# Patient Record
Sex: MALE | Race: WHITE | NOT HISPANIC OR LATINO | URBAN - METROPOLITAN AREA
[De-identification: names, ages, dates, MRNs, and addresses within clinical notes are randomized per-mention and may not be internally consistent; named-entity substitution may affect disease eponyms.]

---

## 2017-01-25 ENCOUNTER — OFFICE VISIT - HEALTHEAST (OUTPATIENT)
Dept: FAMILY MEDICINE | Facility: CLINIC | Age: 32
End: 2017-01-25

## 2017-01-25 ENCOUNTER — COMMUNICATION - HEALTHEAST (OUTPATIENT)
Dept: FAMILY MEDICINE | Facility: CLINIC | Age: 32
End: 2017-01-25

## 2017-01-25 DIAGNOSIS — Z30.09 VASECTOMY EVALUATION: ICD-10-CM

## 2017-01-25 DIAGNOSIS — Z00.00 PHYSICAL EXAM: ICD-10-CM

## 2017-01-25 LAB
CHOLEST SERPL-MCNC: 222 MG/DL
FASTING STATUS PATIENT QL REPORTED: YES
HDLC SERPL-MCNC: 53 MG/DL
LDLC SERPL CALC-MCNC: 147 MG/DL
TRIGL SERPL-MCNC: 111 MG/DL

## 2017-01-25 ASSESSMENT — MIFFLIN-ST. JEOR: SCORE: 1533.2

## 2017-02-01 ENCOUNTER — RECORDS - HEALTHEAST (OUTPATIENT)
Dept: ADMINISTRATIVE | Facility: OTHER | Age: 32
End: 2017-02-01

## 2017-02-03 ENCOUNTER — COMMUNICATION - HEALTHEAST (OUTPATIENT)
Dept: FAMILY MEDICINE | Facility: CLINIC | Age: 32
End: 2017-02-03

## 2017-02-10 ENCOUNTER — OFFICE VISIT - HEALTHEAST (OUTPATIENT)
Dept: FAMILY MEDICINE | Facility: CLINIC | Age: 32
End: 2017-02-10

## 2017-02-10 DIAGNOSIS — R07.89 CHEST DISCOMFORT: ICD-10-CM

## 2017-02-10 DIAGNOSIS — F41.1 ANXIETY STATE: ICD-10-CM

## 2017-02-13 ENCOUNTER — COMMUNICATION - HEALTHEAST (OUTPATIENT)
Dept: FAMILY MEDICINE | Facility: CLINIC | Age: 32
End: 2017-02-13

## 2017-02-13 LAB
ATRIAL RATE - MUSE: 78 BPM
DIASTOLIC BLOOD PRESSURE - MUSE: NORMAL MMHG
INTERPRETATION ECG - MUSE: NORMAL
P AXIS - MUSE: 50 DEGREES
PR INTERVAL - MUSE: 122 MS
QRS DURATION - MUSE: 92 MS
QT - MUSE: 364 MS
QTC - MUSE: 414 MS
R AXIS - MUSE: 82 DEGREES
SYSTOLIC BLOOD PRESSURE - MUSE: NORMAL MMHG
T AXIS - MUSE: 78 DEGREES
VENTRICULAR RATE- MUSE: 78 BPM

## 2017-04-28 ENCOUNTER — RECORDS - HEALTHEAST (OUTPATIENT)
Dept: ADMINISTRATIVE | Facility: OTHER | Age: 32
End: 2017-04-28

## 2017-12-22 ENCOUNTER — OFFICE VISIT - HEALTHEAST (OUTPATIENT)
Dept: FAMILY MEDICINE | Facility: CLINIC | Age: 32
End: 2017-12-22

## 2017-12-22 ENCOUNTER — COMMUNICATION - HEALTHEAST (OUTPATIENT)
Dept: FAMILY MEDICINE | Facility: CLINIC | Age: 32
End: 2017-12-22

## 2017-12-22 DIAGNOSIS — K92.1 HEMATOCHEZIA: ICD-10-CM

## 2017-12-29 ENCOUNTER — COMMUNICATION - HEALTHEAST (OUTPATIENT)
Dept: FAMILY MEDICINE | Facility: CLINIC | Age: 32
End: 2017-12-29

## 2018-12-07 ENCOUNTER — AMBULATORY - HEALTHEAST (OUTPATIENT)
Dept: FAMILY MEDICINE | Facility: CLINIC | Age: 33
End: 2018-12-07

## 2018-12-07 DIAGNOSIS — Z91.018 FOOD ALLERGY: ICD-10-CM

## 2018-12-12 ENCOUNTER — COMMUNICATION - HEALTHEAST (OUTPATIENT)
Dept: FAMILY MEDICINE | Facility: CLINIC | Age: 33
End: 2018-12-12

## 2020-10-01 ENCOUNTER — COMMUNICATION - HEALTHEAST (OUTPATIENT)
Dept: FAMILY MEDICINE | Facility: CLINIC | Age: 35
End: 2020-10-01

## 2020-10-02 ENCOUNTER — OFFICE VISIT - HEALTHEAST (OUTPATIENT)
Dept: FAMILY MEDICINE | Facility: CLINIC | Age: 35
End: 2020-10-02

## 2020-10-02 DIAGNOSIS — Z00.00 HEALTHY ADULT ON ROUTINE PHYSICAL EXAMINATION: ICD-10-CM

## 2020-10-02 DIAGNOSIS — Z11.4 SCREENING FOR HIV (HUMAN IMMUNODEFICIENCY VIRUS): ICD-10-CM

## 2020-10-02 DIAGNOSIS — Z13.220 SCREENING FOR CHOLESTEROL LEVEL: ICD-10-CM

## 2020-10-02 DIAGNOSIS — Z23 NEED FOR IMMUNIZATION AGAINST INFLUENZA: ICD-10-CM

## 2020-10-02 LAB
CHOLEST SERPL-MCNC: 230 MG/DL
FASTING STATUS PATIENT QL REPORTED: YES
HDLC SERPL-MCNC: 57 MG/DL
HIV 1+2 AB+HIV1 P24 AG SERPL QL IA: NEGATIVE
LDLC SERPL CALC-MCNC: 152 MG/DL
TRIGL SERPL-MCNC: 103 MG/DL

## 2020-10-02 ASSESSMENT — MIFFLIN-ST. JEOR: SCORE: 1516.19

## 2020-10-06 ENCOUNTER — AMBULATORY - HEALTHEAST (OUTPATIENT)
Dept: FAMILY MEDICINE | Facility: CLINIC | Age: 35
End: 2020-10-06

## 2020-10-06 DIAGNOSIS — E78.00 HYPERCHOLESTEREMIA: ICD-10-CM

## 2021-03-08 ENCOUNTER — AMBULATORY - HEALTHEAST (OUTPATIENT)
Dept: LAB | Facility: CLINIC | Age: 36
End: 2021-03-08

## 2021-03-08 DIAGNOSIS — E78.00 HYPERCHOLESTEREMIA: ICD-10-CM

## 2021-03-08 LAB
CHOLEST SERPL-MCNC: 197 MG/DL
FASTING STATUS PATIENT QL REPORTED: YES
HDLC SERPL-MCNC: 59 MG/DL
LDLC SERPL CALC-MCNC: 123 MG/DL
TRIGL SERPL-MCNC: 76 MG/DL

## 2021-05-30 VITALS — WEIGHT: 143.38 LBS | BODY MASS INDEX: 22.46 KG/M2

## 2021-05-30 VITALS — HEIGHT: 67 IN | BODY MASS INDEX: 22.13 KG/M2 | WEIGHT: 141 LBS

## 2021-05-31 VITALS — BODY MASS INDEX: 21.77 KG/M2 | WEIGHT: 139 LBS

## 2021-06-04 VITALS
HEART RATE: 88 BPM | HEIGHT: 67 IN | SYSTOLIC BLOOD PRESSURE: 134 MMHG | WEIGHT: 139 LBS | TEMPERATURE: 98.3 F | DIASTOLIC BLOOD PRESSURE: 86 MMHG | BODY MASS INDEX: 21.82 KG/M2 | OXYGEN SATURATION: 100 %

## 2021-06-05 ENCOUNTER — HEALTH MAINTENANCE LETTER (OUTPATIENT)
Age: 36
End: 2021-06-05

## 2021-06-08 NOTE — PROGRESS NOTES
Assessment:      Healthy male exam.      Plan:       Routine lab work will be done today.  The patient will continue a good well-balanced diet.  He also will start a daily aerobic exercise program.  He will limit salt as his blood pressure was borderline today.  He will try to check his pressures outside the clinic more frequently.  He is up-to-date on all other preventive health services.  He would like to have a vasectomy so he will be referred to urology for that.     Subjective:      Dusty Ferro is a 31 y.o. male who presents for an annual exam. The patient reports that there is not domestic violence in his life.  Overall, he is feeling well.  He has no real health issues or concerns at this point in time.  He would like to have a vasectomy.  He will be referred for that.  He states he exercises 2-3 times weekly.  He stays very active otherwise.  He follows a good well-balanced diet.    Healthy Habits:   Regular Exercise: Yes  Sunscreen Use: Yes  Healthy Diet: Yes  Dental Visits Regularly: Yes  Seat Belt: Yes  Sexually active: Yes  Monthly Self Testicular Exams:  No  Hemoccults: No  Flex Sig: No  Colonoscopy: No  Lipid Profile: Yes  Glucose Screen: Yes      Immunization History   Administered Date(s) Administered     Hep A, historic 10/12/2010, 08/16/2011     Influenza, seasonal,quad inj 6-35 mos 12/18/2012     Tdap 10/12/2010     Immunization status: up to date and documented.    No exam data present     No current outpatient prescriptions on file.     No current facility-administered medications for this visit.      No past medical history on file.  No past surgical history on file.  Penicillins  No family history on file.  Social History     Social History     Marital status: Single     Spouse name: N/A     Number of children: N/A     Years of education: N/A     Occupational History     Not on file.     Social History Main Topics     Smoking status: Never Smoker     Smokeless tobacco: Never Used      "Alcohol use 7.2 oz/week     12 Cans of beer per week     Drug use: No     Sexual activity: Yes     Partners: Female     Other Topics Concern     Not on file     Social History Narrative     No narrative on file       Review of Systems  Review of Systems   Constitutional: Negative.  Negative for fatigue and fever.   HENT: Negative.  Negative for congestion.    Eyes: Negative.    Respiratory: Negative.  Negative for shortness of breath.    Cardiovascular: Negative.  Negative for chest pain.   Gastrointestinal: Negative.    Endocrine: Negative.    Genitourinary: Negative.    Musculoskeletal: Negative.    Skin: Negative.  Negative for rash.   Allergic/Immunologic: Negative.    Neurological: Negative.    Hematological: Negative.              Objective:     Vitals:    01/25/17 0801   BP: 148/90   Pulse: 80   Resp: 16   Temp: 98.2  F (36.8  C)   TempSrc: Oral   Weight: 141 lb (64 kg)   Height: 5' 7\" (1.702 m)     Body mass index is 22.08 kg/(m^2).    Physical  Physical Exam   Constitutional: He is oriented to person, place, and time. He appears well-developed and well-nourished. No distress.   HENT:   Right Ear: External ear normal.   Left Ear: External ear normal.   Mouth/Throat: Oropharynx is clear and moist.   Eyes: Conjunctivae and EOM are normal. Pupils are equal, round, and reactive to light.   Neck: Normal range of motion. Neck supple. No JVD present. No thyromegaly present.   Cardiovascular: Normal rate and regular rhythm.    No murmur heard.  Pulmonary/Chest: Effort normal and breath sounds normal. No respiratory distress.   Abdominal: Soft. Bowel sounds are normal. He exhibits no mass. There is no tenderness.   Genitourinary: Penis normal.   Genitourinary Comments: Testicles palpable low in scrotum.  No hernia noted.   Musculoskeletal: Normal range of motion. He exhibits no edema or tenderness.   Lymphadenopathy:     He has no cervical adenopathy.   Neurological: He is alert and oriented to person, place, and " time. He has normal reflexes.   Skin: Skin is warm and dry. No rash noted.   Psychiatric: He has a normal mood and affect.

## 2021-06-08 NOTE — PROGRESS NOTES
"ASSESSMENT AND PLAN:  1. Chest discomfort     Currently patient does not have chest pain he has been experiencing intermittent bouts of chest discomfort lasting 20 minutes for the last 3 days.  It's not exertional.  His cardiac symptoms unremarkable EKG reviewed by myself today showed no acute ST segment changes.  Chest x-ray showed no abnormalities.  The differential would include coronary artery disease, GERD, musculoskeletal discomfort anxiety or pulmonary disease we discussed this in detail with him today.  He will track his symptoms if they continue to be prevalent I which just in exercise stress test.  Experiences excruciating pain or worsening pain he needs to the ER.   Electrocardiogram Perform and Read    XR Chest PA and Lateral   2. Anxiety state the chest pain started when he had increased anxiety at work since then she experienced the chest pain over the last 3 days he's had a similar bout of chest pain previously been an anxiety issue and that only lasted for several hours at that time.  I did go through a BHARATHI profile with him and suggested a low dose SSRI may be in order he will think about that as he does not want to take medication at this time.  He mentions that he's always been \"\" anxious\"         No orders of the defined types were placed in this encounter.    There are no discontinued medications.    No Follow-up on file.    CHIEF COMPLAINT:  Chief Complaint   Patient presents with     Anxiety     Pt states he has been having some chest pressure, ongoing since Wednesday, intermittent per pt.       HISTORY OF PRESENT ILLNESS:  Dusty is a 31 y.o. male presenting with anxiety. He states that he had a stressful day at work on Wednesday and since then he has been experiencing intermittent chest pressure. The pressure feels like his \"blood pressure is up\".  The episodes of chest pressure will last about 20 minutes. He notes that the symptoms will worsen when he does something stressful. The pressure " will move around his chest but does not move into his abdomen. He notes that he has had this sensation before but only for 20 minutes. The pressure does not cause shortness of breath and does not change with eating foods. The pressure does also does not change with postural changes.     REVIEW OF SYSTEMS:   He will have things running through his mind then he goes to bed at night. He notes that he has always been an anxious person.   Denies vomiting, diarrhea and constipation.   He sleeps about 6 hours.   All other 10 point review of systems are negative.    PFSH:  Father has history of heart attack (age 55), a fib and hyperlipidemia. He works in [email or web address]. Reviewed as below.     TOBACCO USE:  History   Smoking Status     Never Smoker   Smokeless Tobacco     Never Used       VITALS:  Vitals:    02/10/17 1226   BP: 138/88   Patient Site: Right Arm   Patient Position: Sitting   Cuff Size: Adult Regular   Pulse: 88   Resp: 20   Temp: 98  F (36.7  C)   TempSrc: Oral   Weight: 143 lb 6 oz (65 kg)     Wt Readings from Last 3 Encounters:   02/10/17 143 lb 6 oz (65 kg)   01/25/17 141 lb (64 kg)     Body mass index is 22.46 kg/(m^2).      PHYSICAL EXAM:  General: Alert, cooperative, no distress, appears stated age  Head: Normocephalic, without obvious abnormality, atraumatic  Ears: Normal TM's and external ear canals, both ears  Nose: Nares normal, septum midline, mucosa normal, no drainage or sinus tenderness  Throat: Lips, mucosa, and tongue normal; teeth and gums normal  Back: Symmetric, no curvature, ROM normal, no CVA tenderness  Lungs: Clear to auscultation bilaterally, respirations unlabored  Chest wall: No tenderness or deformity  Heart: Regular rate and rhythm, S1 and S2 normal, no murmur, rub, or gallop  Abdomen: Soft, non tender, bowel sounds active all four quadrants, no masses, no organomegaly.  Neurologic:  A & O x 3.  No tremor, no focal findings.        EKG: No acute ST changes.     DATA REVIEWED:  Additional  History from Old Records Summarized (2): Reviewed Dr. Mcallister's note from 1/25/2017 regarding physical.  Decision to Obtain Records (1): None  Radiology Tests Summarized or Ordered (1): Chest x-ray ordered.  No abnormalities noted  Labs Reviewed or Ordered (1): Lipid panel and BMP reviewed from annual physical in January 2017  Medicine Test Summarized or Ordered (1): EKG ordered.   Independent Review of EKG or X-RAY(2 each): EKG personally interpreted.  No acute ST segment changes Chest x-ray personally interpreted.     The visit lasted a total of 40 minutes face to face with the patient. Over 50% of the time was spent counseling and educating the patient about chest pressure .     ICarlie, am scribing for and in the presence of, Dr. Estevez.    I, Dr. Estevez, personally performed the services described in this documentation, as scribed by Carlie Davis in my presence, and it is both accurate and complete.      MEDICATIONS:  No current outpatient prescriptions on file.     No current facility-administered medications for this visit.        Total Data Points: 9

## 2021-06-11 NOTE — TELEPHONE ENCOUNTER
Left message that [facility] ([phone]) is calling to start some charting for 10/2/20 physical appt.

## 2021-06-14 NOTE — PROGRESS NOTES
Subjective:      Patient ID: Dusty Ferro is a 32 y.o. male.    Chief Complaint:    HPI Dusty Ferro is a 32 y.o. male who presents today complaining of diarrhea x 1 week. He also has lower abdominal cramping and has had blood in the stool x 1 days. He denies ever having blood in his stool before. The blood was bright red and mixed into the stool with mucous. He has not had any stool today. He thought he may have had a fever last week, but nothing high or prolonged. He denies any recent travel. Yesterday he had about 3-4 stools and just the last one had blood. He denies any recent abx use. He has not ever taken any medications over the counter for his symptoms.       Family History   Problem Relation Age of Onset     Heart attack Father 55     Hyperlipidemia Father      Atrial fibrillation Father        Social History   Substance Use Topics     Smoking status: Never Smoker     Smokeless tobacco: Never Used     Alcohol use 7.2 oz/week     12 Cans of beer per week       Review of Systems   Constitutional: Positive for fatigue. Negative for appetite change and fever.   Gastrointestinal: Positive for abdominal pain (cramping), blood in stool and diarrhea. Negative for nausea and vomiting.   Neurological: Positive for light-headedness.       Objective:     /70  Pulse 74  Temp 98.1  F (36.7  C) (Oral)   Resp 14  Wt 139 lb (63 kg)  SpO2 98%  BMI 21.77 kg/m2    Physical Exam   Constitutional: He appears well-developed and well-nourished. No distress.   HENT:   Head: Normocephalic and atraumatic.   Right Ear: External ear normal.   Left Ear: External ear normal.   Eyes: Conjunctivae are normal.   Cardiovascular: Normal rate, regular rhythm and normal heart sounds.  Exam reveals no gallop and no friction rub.    No murmur heard.  Pulmonary/Chest: Effort normal and breath sounds normal. No respiratory distress. He has no wheezes. He has no rales.   Abdominal: Soft. Bowel sounds are normal. He exhibits no  distension. There is no tenderness. There is no rebound and no guarding.   Skin: He is not diaphoretic.   Psychiatric: He has a normal mood and affect. His behavior is normal. Judgment and thought content normal.   Nursing note and vitals reviewed.    Clinical Decision Making:  Symptoms could be consistent with viral gastroenteritis, considering the duration of symptoms the presence of hematochezia I will order stool tests for ova parasites and culture.  Patient is not at risk for any toxin producing causes of colitis, but will recommend that he not use antidiarrheal such as loperamide.  BMP, sedimentation rate, and CRP were all normal today.  Inflammatory markers were negative is supportive of this not being an inflammatory process such as Crohn's or ulcerative colitis.  If symptoms of hematochezia persist and cultures negative I will refer the patient to GI.  Patient is hemodynamically stable with a normal hemoglobin and normal electrolyte levels despite recent bouts of diarrhea.    Assessment:     Procedures    1. Hematochezia  HM1(CBC and Differential)    Basic Metabolic Panel    Sedimentation Rate    C-Reactive Protein(CRP)    HM1 (CBC with Diff)    Manual Differential    Culture, Stool    Ova and Parasite, Stool    Culture, Stool    Ova and Parasite, Stool    EnteroHaemorrhagic E. coli Work Up    CANCELED: Culture, Stool    CANCELED: Ova and Parasite, Stool         Patient Instructions   1. Seek emergency medical attention with for severe point tenderness.   2. Drop off your stool samples at any [facility] lab. Our lab will also be open on [address] for limited hours. You will be called with the stool culture results.   3. If the results of the stool test are negative and you are continuing to have blood in your stool. Then I will recommend being seen by Gastroenterology.

## 2021-06-15 PROBLEM — Z98.52 H/O VASECTOMY: Status: ACTIVE | Noted: 2017-05-04

## 2021-06-29 NOTE — PROGRESS NOTES
Progress Notes by Tali Stauffer MD at 10/2/2020  9:45 AM     Author: Tali Stauffer MD Service: -- Author Type: Physician    Filed: 10/2/2020  1:20 PM Encounter Date: 10/2/2020 Status: Signed    : Tali Stauffer MD (Physician)       MALE PREVENTATIVE EXAM    Assessment and Plan:   1. Need for immunization against influenza    - Influenza, Seasonal Quad, PF =/> 6months    2. Healthy adult on routine physical examination  Will check lipid cascade.  Reviewed that if his lipids are quite high, we could discuss statin given his family history.    Encouraged ongoing healthy diet and regular exercise.    - Td, Preservative Free (green label)  - Lipid Cascade  - miscellaneous medical supply (BLOOD PRESSURE CUFF) Misc; Use 1 Device As Directed daily.  Dispense: 1 each; Refill: 0  He will check his bp at home . Reviewed strategies for decreasing blood pressure at home with decrease caffeine.      3. Screening for cholesterol level    - Lipid Cascade    4. Screening for HIV (human immunodeficiency virus)    - HIV Antigen/Antibody Screening [place]            Next follow up:  No follow-ups on file.    Immunization Review  Adult Imm Review: Due today, orders placed      I discussed the following with the patient:   Adult Healthy Living: Importance of regular exercise  Healthy nutrition  Weight loss referral options  Getting adequate sleep  Stress management    I have had an Advance Directives discussion with the patient.    Subjective:   Chief Complaint: Dusty Ferro is an 35 y.o. male here for a preventative health visit.    Patient has been advised of split billing requirements and indicates understanding: Yes  HPI:   He is doing well.  He denies any problems at this time, and in fact says that he is feeling better than he has in the past.    He is exercising a lot. He is eating well.  He denies any cp, sobr.  His children are doing home school.  He continues to work, but mostly remotely.    No  "new health problems in his family.  His dad did have an MI at age 55, he had high cholesterol and was a smoker.    He does wonder how his blood pressure is.  He says there might be a component of white coat hypertension.        Healthy Habits  Are you taking a daily aspirin? No  Do you typically exercising at least 40 min, 3-4 times per week?  Yes  Do you usually eat at least 4 servings of fruit and vegetables a day, include whole grains and fiber and avoid regularly eating high fat foods? Yes  Have you had an eye exam in the past two years? NO  Do you see a dentist twice per year? Once a year  Do you have any concerns regarding sleep? No    Safety Screen  If you own firearms, are they secured in a locked gun cabinet or with trigger locks? NO  Do you feel you are safe where you are living?: Yes (10/2/2020  9:32 AM)  Do you feel you are safe in your relationship(s)?: Yes (10/2/2020  9:32 AM)      Review of Systems:  Please see above.  The rest of the review of systems are negative for all systems.     Cancer Screening     Patient has no health maintenance due at this time              History     Reviewed By Date/Time Sections Reviewed    Gill Hall MA 10/2/2020  9:33 AM Tobacco, Alcohol, Drug Use            Objective:   Vital Signs:   Visit Vitals  /86 (Patient Site: Left Arm, Patient Position: Sitting, Cuff Size: Adult Regular)   Pulse 88   Temp 98.3  F (36.8  C) (Oral)   Ht 5' 6.5\" (1.689 m)   Wt 139 lb (63 kg)   SpO2 100%   BMI 22.10 kg/m           PHYSICAL EXAM  General Appearance: Alert, cooperative, no distress, appears stated age  Head: Normocephalic, without obvious abnormality, atraumatic  Eyes: PERRL, conjunctiva/corneas clear, EOM's intact  Ears: Normal TM's and external ear canals, both ears  Nose: Nares normal, septum midline,mucosa normal, no drainage  Throat: Lips, mucosa, and tongue normal; teeth and gums normal  Neck: Supple, symmetrical, trachea midline, no adenopathy;  thyroid: not " enlarged, symmetric, no tenderness/mass/nodules; no carotid bruit or JVD  Back: Symmetric, no curvature, ROM normal, no CVA tenderness  Lungs: Clear to auscultation bilaterally, respirations unlabored  Breasts: No breast masses, tenderness, asymmetry, or nipple discharge.  Heart: Regular rate and rhythm, S1 and S2 normal, no murmur, rub, or gallop,   Abdomen: Soft, non-tender, bowel sounds active all four quadrants,  no masses, no organomegaly.  Extremities: Extremities normal, atraumatic, no cyanosis or edema  Skin: Skin color, texture, turgor normal, no rashes or lesions.   noted.   Lymph nodes: Cervical, supraclavicular, and axillary nodes normal  Neurologic: Normal              Medication List          Accurate as of October 2, 2020 12:49 PM. If you have any questions, ask your nurse or doctor.            START taking these medications    Blood Pressure Cuff Misc  INSTRUCTIONS: Use 1 Device As Directed daily.  Generic drug: miscellaneous medical supply  Started by: Tali Stauffer MD           CONTINUE taking these medications    cetirizine 10 MG tablet  Also known as: ZyrTEC  INSTRUCTIONS: Take 10 mg by mouth daily.        EPINEPHrine 0.3 mg/0.3 mL injection  Also known as: EpiPen  INSTRUCTIONS: Inject 0.3 mL (0.3 mg total) as directed as needed for anaphylaxis Inject into thigh..              Where to Get Your Medications      You can get these medications from any pharmacy    Bring a paper prescription for each of these medications    Blood Pressure Cuff Misc         Additional Screenings Completed Today:

## 2021-09-25 ENCOUNTER — HEALTH MAINTENANCE LETTER (OUTPATIENT)
Age: 36
End: 2021-09-25

## 2021-11-20 ENCOUNTER — HEALTH MAINTENANCE LETTER (OUTPATIENT)
Age: 36
End: 2021-11-20

## 2022-12-26 ENCOUNTER — HEALTH MAINTENANCE LETTER (OUTPATIENT)
Age: 37
End: 2022-12-26

## 2024-02-04 ENCOUNTER — HEALTH MAINTENANCE LETTER (OUTPATIENT)
Age: 39
End: 2024-02-04